# Patient Record
Sex: FEMALE | Race: BLACK OR AFRICAN AMERICAN | NOT HISPANIC OR LATINO | Employment: OTHER | URBAN - METROPOLITAN AREA
[De-identification: names, ages, dates, MRNs, and addresses within clinical notes are randomized per-mention and may not be internally consistent; named-entity substitution may affect disease eponyms.]

---

## 2017-01-31 ENCOUNTER — TELEPHONE (OUTPATIENT)
Dept: HEPATOLOGY | Facility: CLINIC | Age: 62
End: 2017-01-31

## 2017-01-31 NOTE — TELEPHONE ENCOUNTER
----- Message from Michael Flores sent at 2017  8:17 AM CST -----  Contact: pt   ..Patient wants to schedule a follow up appointment 02/10/17.     Patient's Name: Ai Gay  Patient's Phone Number:   Patient's : [id number]

## 2017-03-14 ENCOUNTER — TELEPHONE (OUTPATIENT)
Dept: HEPATOLOGY | Facility: CLINIC | Age: 62
End: 2017-03-14

## 2017-03-14 NOTE — TELEPHONE ENCOUNTER
Pt no showed for f/u appt. Please call her to reschedule or send her a letter. I see we have been having difficulty with contacting her.

## 2017-03-29 ENCOUNTER — TELEPHONE (OUTPATIENT)
Dept: HEPATOLOGY | Facility: CLINIC | Age: 62
End: 2017-03-29

## 2017-03-29 NOTE — TELEPHONE ENCOUNTER
----- Message from Michael Flores sent at 3/29/2017  9:28 AM CDT -----  Contact: pt   Please call, [phone]

## 2017-03-29 NOTE — TELEPHONE ENCOUNTER
MA attempted to call patient back, she is unable to reached VM is full cannot leave her VM. KERVIN

## 2017-04-03 DIAGNOSIS — K76.82 HEPATIC ENCEPHALOPATHY: ICD-10-CM

## 2017-04-03 DIAGNOSIS — K70.9 ALCOHOLIC LIVER DISEASE: ICD-10-CM

## 2017-04-03 RX ORDER — FUROSEMIDE 20 MG/1
20 TABLET ORAL DAILY
Qty: 90 TABLET | Refills: 3 | OUTPATIENT
Start: 2017-04-03 | End: 2017-07-02

## 2017-04-03 NOTE — TELEPHONE ENCOUNTER
MA called pt. No answer. Left detailed message letting her know that Renee refused to refill her furosemide because she needs an appt. Left call back number. EMS

## 2017-04-05 ENCOUNTER — TELEPHONE (OUTPATIENT)
Dept: HEPATOLOGY | Facility: CLINIC | Age: 62
End: 2017-04-05

## 2017-04-05 NOTE — TELEPHONE ENCOUNTER
Received refill request from pharmacy.     patient overdue for labs and U/S and follow up appt.     MA called pt x2, no answer, unable to leave message VM full.

## 2017-05-17 ENCOUNTER — TELEPHONE (OUTPATIENT)
Dept: HEPATOLOGY | Facility: CLINIC | Age: 62
End: 2017-05-17

## 2017-05-17 DIAGNOSIS — K76.82 HE (HEPATIC ENCEPHALOPATHY): ICD-10-CM

## 2017-05-17 DIAGNOSIS — K70.30 ALCOHOLIC CIRRHOSIS OF LIVER WITHOUT ASCITES: Primary | ICD-10-CM

## 2017-05-17 NOTE — TELEPHONE ENCOUNTER
----- Message from Shari Eubanks sent at 5/15/2017  3:52 PM CDT -----  Contact: pt  Calling to schedule a follow up . Please call

## 2017-05-17 NOTE — TELEPHONE ENCOUNTER
MA spoke with pt, follow up appt schedule. Patient address verified, appt letter mailed to patient.

## 2019-05-24 RX ORDER — DULAGLUTIDE 1.5 MG/.5ML
INJECTION, SOLUTION SUBCUTANEOUS
Qty: 12 SYRINGE | Refills: 4 | Status: SHIPPED | OUTPATIENT
Start: 2019-05-24

## 2019-08-08 RX ORDER — LOSARTAN POTASSIUM 50 MG/1
TABLET ORAL
COMMUNITY

## 2019-12-24 RX ORDER — TIZANIDINE 2 MG/1
TABLET ORAL
COMMUNITY

## 2019-12-24 RX ORDER — LORAZEPAM 1 MG/1
TABLET ORAL
COMMUNITY

## 2019-12-26 RX ORDER — LORAZEPAM 1 MG/1
TABLET ORAL
Qty: 30 TABLET | Refills: 0 | OUTPATIENT
Start: 2019-12-26

## 2019-12-26 RX ORDER — TIZANIDINE 2 MG/1
TABLET ORAL
OUTPATIENT
Start: 2019-12-26

## 2020-01-12 RX ORDER — SPIRONOLACTONE 50 MG/1
TABLET, FILM COATED ORAL
Qty: 30 TABLET | Refills: 14 | OUTPATIENT
Start: 2020-01-12

## 2020-02-11 RX ORDER — TRAZODONE HYDROCHLORIDE 100 MG/1
TABLET ORAL
Qty: 30 TABLET | Refills: 14 | OUTPATIENT
Start: 2020-02-11

## 2020-02-17 RX ORDER — SPIRONOLACTONE 50 MG/1
TABLET, FILM COATED ORAL
Qty: 30 TABLET | Refills: 14 | OUTPATIENT
Start: 2020-02-17

## 2020-03-13 RX ORDER — TRAZODONE HYDROCHLORIDE 100 MG/1
TABLET ORAL
Qty: 30 TABLET | Refills: 11 | OUTPATIENT
Start: 2020-03-13

## 2020-03-19 RX ORDER — SPIRONOLACTONE 50 MG/1
TABLET, FILM COATED ORAL
Qty: 30 TABLET | Refills: 11 | OUTPATIENT
Start: 2020-03-19

## 2021-05-12 ENCOUNTER — PATIENT MESSAGE (OUTPATIENT)
Dept: RESEARCH | Facility: HOSPITAL | Age: 66
End: 2021-05-12